# Patient Record
Sex: MALE | Race: WHITE | NOT HISPANIC OR LATINO | URBAN - METROPOLITAN AREA
[De-identification: names, ages, dates, MRNs, and addresses within clinical notes are randomized per-mention and may not be internally consistent; named-entity substitution may affect disease eponyms.]

---

## 2017-01-01 ENCOUNTER — OFFICE VISIT - HEALTHEAST (OUTPATIENT)
Dept: GERIATRICS | Facility: CLINIC | Age: 82
End: 2017-01-01

## 2017-01-01 ENCOUNTER — COMMUNICATION - HEALTHEAST (OUTPATIENT)
Dept: GERIATRICS | Facility: CLINIC | Age: 82
End: 2017-01-01

## 2017-01-01 DIAGNOSIS — R13.10 DYSPHAGIA, UNSPECIFIED TYPE: ICD-10-CM

## 2017-01-01 DIAGNOSIS — G30.9 ALZHEIMER'S DEMENTIA WITHOUT BEHAVIORAL DISTURBANCE, UNSPECIFIED TIMING OF DEMENTIA ONSET: ICD-10-CM

## 2017-01-01 DIAGNOSIS — F02.80 ALZHEIMER'S DEMENTIA WITHOUT BEHAVIORAL DISTURBANCE, UNSPECIFIED TIMING OF DEMENTIA ONSET: ICD-10-CM

## 2017-01-01 DIAGNOSIS — Z51.5 PALLIATIVE CARE ENCOUNTER: ICD-10-CM

## 2017-01-01 DIAGNOSIS — Z86.69 HISTORY OF SEIZURE DISORDER: ICD-10-CM

## 2017-01-01 DIAGNOSIS — R13.0 APHAGIA: ICD-10-CM

## 2017-01-01 DIAGNOSIS — R47.01 APHASIA: ICD-10-CM

## 2017-01-01 DIAGNOSIS — Z87.19 HISTORY OF GI BLEED: ICD-10-CM

## 2017-01-01 DIAGNOSIS — R13.10 DYSPHAGIA: ICD-10-CM

## 2017-01-01 DIAGNOSIS — F41.9 ANXIETY: ICD-10-CM

## 2017-01-01 RX ORDER — LORAZEPAM 0.5 MG/1
0.25 TABLET ORAL 2 TIMES DAILY
Qty: 90 TABLET | Refills: 3 | Status: SHIPPED | OUTPATIENT
Start: 2017-01-01

## 2017-01-01 ASSESSMENT — MIFFLIN-ST. JEOR
SCORE: 1317.18
SCORE: 1306.29
SCORE: 1215.57
SCORE: 1317.18
SCORE: 1215.57
SCORE: 1252.31

## 2017-12-26 ENCOUNTER — AMBULATORY - HEALTHEAST (OUTPATIENT)
Dept: GERIATRICS | Facility: CLINIC | Age: 82
End: 2017-12-26

## 2021-05-30 ENCOUNTER — RECORDS - HEALTHEAST (OUTPATIENT)
Dept: ADMINISTRATIVE | Facility: CLINIC | Age: 86
End: 2021-05-30

## 2021-05-30 VITALS — BODY MASS INDEX: 24.75 KG/M2 | HEIGHT: 66 IN | WEIGHT: 154 LBS

## 2021-05-31 ENCOUNTER — RECORDS - HEALTHEAST (OUTPATIENT)
Dept: ADMINISTRATIVE | Facility: CLINIC | Age: 86
End: 2021-05-31

## 2021-05-31 VITALS — BODY MASS INDEX: 25.13 KG/M2 | WEIGHT: 156.4 LBS | HEIGHT: 66 IN

## 2021-05-31 VITALS — WEIGHT: 156.4 LBS | HEIGHT: 66 IN | BODY MASS INDEX: 25.13 KG/M2

## 2021-05-31 VITALS — HEIGHT: 66 IN | BODY MASS INDEX: 22.84 KG/M2 | WEIGHT: 142.1 LBS

## 2021-05-31 VITALS — WEIGHT: 134 LBS | HEIGHT: 66 IN | BODY MASS INDEX: 21.53 KG/M2

## 2021-05-31 VITALS — WEIGHT: 134 LBS | BODY MASS INDEX: 21.53 KG/M2 | HEIGHT: 66 IN

## 2021-06-02 ENCOUNTER — RECORDS - HEALTHEAST (OUTPATIENT)
Dept: ADMINISTRATIVE | Facility: CLINIC | Age: 86
End: 2021-06-02

## 2021-06-08 NOTE — PROGRESS NOTES
[facility]    Facility:   [facility] [[id number]]   Code Status: UNKNOWN      CHIEF COMPLAINT/REASON FOR VISIT:  Chief Complaint   Patient presents with     Review Of Multiple Medical Conditions     Severe dementia without behaviors with a aphasia and dysphasia. He did recently graduate from hospice, right eye conjunctivitis with possible blepharitis which is been treated.       HISTORY:      HPI: Anastacio is a 84 y.o. male who resides here at the [facility] with severe Alzheimer's disease which is progressive. He is nonambulatory and nonverbal and does have dysphagia. He does eat puréed food in he's eating drinking okay with difficulty according to the staff. He likely has not changed since her last visit and he seems comfortable at this time. Because of is a aphasia I am unable to get a review of systems at this time. Staff is no new concerns except for some dandruff.    Past Medical History   Diagnosis Date     Anemia      Dementia of the Alzheimer's type      Family history of GI bleeding      History of seizure disorder              History reviewed. No pertinent family history.  Social History     Social History     Marital status: Single     Spouse name: N/A     Number of children: N/A     Years of education: N/A     Social History Main Topics     Smoking status: Unknown If Ever Smoked     Smokeless tobacco: None     Alcohol use None     Drug use: None     Sexual activity: Not Asked     Other Topics Concern     None     Social History Narrative         Review of Systems   Reason unable to perform ROS: patient is a phasic. With severe dementia.       .  Vitals:    01/25/17 1331   BP: 132/78   Pulse: 78   Resp: 20   Temp: 97.4  F (36.3  C)   SpO2: 96%       Physical Exam   Constitutional: No distress.   HENT:   Head: Atraumatic.   Eyes: Right eye exhibits no discharge. Left eye exhibits no discharge.   Neck: No thyromegaly present.   Cardiovascular:  Normal rate and regular rhythm.    Pulmonary/Chest: No respiratory distress. He has no wheezes. He has no rales.   Abdominal: He exhibits no distension. There is no tenderness.   Musculoskeletal: He exhibits no edema, tenderness or deformity.   Neurological:   A phasic does not follow commands.   Skin: He is not diaphoretic.         LABS:       ASSESSMENT:      ICD-10-CM    1. Alzheimer's dementia without behavioral disturbance, unspecified timing of dementia onset G30.9     F02.80    2. Dysphagia R13.10    3. Aphasia R47.01    4. History of seizure disorder Z86.69        PLAN:  Plan at this time is to continue to monitor above medical problems and no other changes to care plan at this  Time. Care plan was reviewed and is appropriate.      Total  minutes of which % was spent counseling and coordination of care of the above plan.    Electronically signed by: Fantasma Hernandes DO

## 2021-06-09 NOTE — PROGRESS NOTES
[facility]    Name:   Anastacio Pantoja  : 3/2/1932  Facility:   [facility] [[id number]]   Room: 205B  Code Status: DNR/DNI -   Fac type:   NF (Long Term Care, LTC) -     CHIEF COMPLAINT / REASON FOR VISIT:  Chief Complaint   Patient presents with     Review Of Multiple Medical Conditions    Patient was last seen by me on 16 and subsequently seen by Dr. Hernandes on 17.    HPI: Anastacio is an 85 y.o. male who had been on hospice until 16, when he was discharged as he was no longer eligible with a life expectancy of <6 months. Nonetheless his primary diagnosis is Alzheimer's dementia, and it is end-stage. He is essentially nonverbal and has contractures of bilateral hands. He also suffers now from dysphagia, requiring a puréed diet. When seen today, he is pretty much unresponsive as he has been during earlier visits. He is found in a tilt in space chair. He receives 0.25 mg of lorazepam each day at 6 AM. Needless to say, a review of systems with the patient is not possible. Nursing staff note no particular issues, and no new orders have been written since he was last seen by me. He does have a history of seizure disorder but has had no recent seizure activity. There has also been no evidence of G.I. discomfort suggestive of a recurrence of his G.I. bleed of a few years back. He does continue on famotidine for that. The only other medications he receives are Colace and morphine. Bowels are working well.    Past Medical History:   Diagnosis Date     Anemia      Dementia of the Alzheimer's type      Family history of GI bleeding      History of seizure disorder               No family history on file.  Social History     Social History     Marital status: Single     Spouse name: N/A     Number of children: N/A     Years of education: N/A     Social History Main Topics     Smoking status: Unknown If Ever Smoked     Smokeless tobacco: Not on file     Alcohol use  "Not on file     Drug use: Not on file     Sexual activity: Not on file     Other Topics Concern     Not on file     Social History Narrative     MEDICATIONS: Reviewed from the MAR, physician orders, and earlier progress notes.  Current Outpatient Prescriptions   Medication Sig     acetaminophen (TYLENOL) 650 MG CR tablet Take 650 mg by mouth 3 (three) times a day. Max dose of 3000 mg/24 hours from all sources      docusate sodium (COLACE) 100 MG capsule Take 100 mg by mouth 2 (two) times a day. Indications: Constipation     famotidine (PEPCID) 20 MG tablet Take 20 mg by mouth bedtime.     LORazepam (ATIVAN) 0.5 MG tablet Take 0.25 mg by mouth 2 (two) times a day. Give at AM and HS due to combative behaviors. May repeat X 1 within the hour if ineffective.      morphine 20 mg/1 mL concentrated solution Take 5 mg by mouth every 4 (four) hours as needed for pain. Indications: PAIN OR SOB     ALLERGIES: No Known Allergies    DIET: Regular, puréed texture.    Vitals:    03/14/17 1826   BP: 128/80   Pulse: 80   Resp: 20   Temp: 96.6  F (35.9  C)   Weight: 154 lb (69.9 kg)   Height: 5' 6\" (1.676 m)     EXAMINATION:   General: Severely demented elderly male, up in a tilt in space wheelchair, essentially nonverbal.  Head: Normocephalic and atraumatic.   Eyes: PERRLA, sclerae clear.   ENT: Moist oral mucosa.   Cardiovascular: Regular rate and rhythm. No appreciable murmur.  Respiratory: Lungs clear to auscultation bilaterally.   Abdomen: Soft and nontender.   Musculoskeletal/Extremities: Age-related degenerative joint disease. No peripheral edema.  Integument: No rashes, clinically significant lesions, or skin breakdown.   Cognitive/Psychiatric:  Severely demented with unscorable BIMS. He does smile but is otherwise aphasic.    DIAGNOSTICS:   11/01/16: TSH 4.65; 25 OH vitamin D 43.  05/05/15: sodium 145, potassium 4.6, chloride 107, CO2 32, BUN 25, creatinine.9, estimate GFR >60, calcium 9.2; WBC 4.5, RBC 4.41, hemoglobin " 13.9, hematocrit 41.9, MCV 95, platelets 145.    ASSESSMENT/Plan:      ICD-10-CM    1. Alzheimer's dementia without behavioral disturbance, unspecified timing of dementia onset G30.9     F02.80    2. Aphasia R47.01    3. Dysphagia, unspecified type R13.10    4. History of seizure disorder Z86.69    5. History of GI bleed Z87.19      CHANGES:    None.    CARE PLAN:    The care plan has been reviewed and all orders signed. Changes to care plan, if any, as noted. Otherwise, continue care plan of care.      Electronically signed by: Antonino Navarro, CNP

## 2021-06-10 NOTE — PROGRESS NOTES
[facility]    Facility:   [facility] [[id number]]   Code Status: UNKNOWN      CHIEF COMPLAINT/REASON FOR VISIT:  Chief Complaint   Patient presents with     Review Of Multiple Medical Conditions     Severe dementia, aphasia, dysphagia, recent conjunctivitis, comfort care management, constipation, elevated TSH.       HISTORY:      HPI: Anastacio is a 85 y.o. male who resides here at the [facility] on comfort cares and palliative management secondary to multiple medical problems which include Alzheimer's dementia which is progressive he is basically nonverbal and he does have swallowing issues on thick puréed food.  He is on thickened liquids also and staff indicates to me that he is doing well.  He was taken off hospice secondary to his life expectancy seem to be greater than 6 months.  Staff has no concerns at this time and patient does not indicate to me there any issues.  Patient today is nonverbal and does not really respond too much to me except for smiling.  He seems extremely comfortable at this time and staff has no new concerns.    Past Medical History:   Diagnosis Date     Anemia      Dementia of the Alzheimer's type      Family history of GI bleeding      History of seizure disorder              History reviewed. No pertinent family history.  Social History     Social History     Marital status: Single     Spouse name: N/A     Number of children: N/A     Years of education: N/A     Social History Main Topics     Smoking status: Unknown If Ever Smoked     Smokeless tobacco: None     Alcohol use None     Drug use: None     Sexual activity: Not Asked     Other Topics Concern     None     Social History Narrative         Review of Systems   Unable to perform ROS: Dementia       .  Vitals:    04/26/17 1038   BP: 132/80   Pulse: 78   Resp: 20   Temp: (!) 96.4  F (35.8  C)   SpO2: 96%       Physical Exam   Constitutional:   Patient is sitting in chair  comfortable with a reclining position.  He is alert but not orientated at all and he does not verbalize.   HENT:   Head: Normocephalic and atraumatic.   Eyes: Right eye exhibits no discharge. No scleral icterus.   Cardiovascular: Normal rate and regular rhythm.  Exam reveals no gallop.    No murmur heard.  Pulmonary/Chest: Effort normal and breath sounds normal. No respiratory distress.   Abdominal: Soft. Bowel sounds are normal. There is no tenderness. There is no rebound.   Musculoskeletal: He exhibits no edema or tenderness.   Neurological: He is alert.   Skin: Skin is warm.         LABS: Laboratory values are as follows vitamin D level on November 1, 2016 was 46 TSH on that date was 4.65 and on 5/5/2015 basic metabolic profile and CBC were within normal limits.  No further labs since then.      ASSESSMENT:      ICD-10-CM    1. Alzheimer's dementia without behavioral disturbance, unspecified timing of dementia onset G30.9     F02.80    2. Aphasia R47.01    3. Dysphagia, unspecified type R13.10    4. Palliative care encounter Z51.5        PLAN: Plan at this time is to continue to monitor above medical problems and no other changes to care plan at this time.  We will continue with comfort care measures and palliative care measures and no other changes.      Total  minutes of which % was spent counseling and coordination of care of the above plan.    Electronically signed by: Fantasma Hernandes DO

## 2021-06-10 NOTE — PROGRESS NOTES
[facility]    Name:   Anastacio Pantoja  : 3/2/1932  Facility:   [facility] [[id number]]   Room: 205B  Code Status: DNR/DNI -   Fac type:   NF (Long Term Care, LTC) -     CHIEF COMPLAINT / REASON FOR VISIT:  Chief Complaint   Patient presents with     Review Of Multiple Medical Conditions    Patient was last seen by me on 17 and subsequently seen by Dr. Hernandes on 17.    HPI: Anastacio is an 85 y.o. male who had been on hospice until mid-2016, when he was discharged as he was no longer eligible with a life expectancy of <6 months. Nonetheless his primary diagnosis is Alzheimer's dementia, and it is end-stage. He is essentially nonverbal and has contractures of bilateral hands. He also suffers now from dysphagia, requiring a puréed diet. When seen today, he is pretty much unresponsive as he has been during earlier visits. He is found in a tilt in space chair. He receives 0.25 mg of lorazepam each day at 6 AM.     Needless to say, a review of systems with the patient is not possible. Nursing staff note no particular issues, and no new orders have been written since he was last seen by me. He does have a history of seizure disorder but has had no recent seizure activity. There has also been no evidence of G.I. discomfort suggestive of a recurrence of his G.I. bleed of a few years back. He does continue on famotidine for that. The only other medications he receives are Colace and morphine. Bowels are working well. His weight has been steady.    Past Medical History:   Diagnosis Date     Anemia      Dementia of the Alzheimer's type      Family history of GI bleeding      History of seizure disorder               No family history on file.  Social History     Social History     Marital status: Single     Spouse name: N/A     Number of children: N/A     Years of education: N/A     Social History Main Topics     Smoking status: Unknown If Ever Smoked     Smokeless  "tobacco: Not on file     Alcohol use Not on file     Drug use: Not on file     Sexual activity: Not on file     Other Topics Concern     Not on file     Social History Narrative     MEDICATIONS: Reviewed from the MAR, physician orders, and earlier progress notes.  Current Outpatient Prescriptions   Medication Sig     acetaminophen (TYLENOL) 650 MG CR tablet Take 650 mg by mouth 3 (three) times a day. Max dose of 3000 mg/24 hours from all sources      docusate sodium (COLACE) 100 MG capsule Take 100 mg by mouth 2 (two) times a day. Indications: Constipation     famotidine (PEPCID) 20 MG tablet Take 20 mg by mouth bedtime.     LORazepam (ATIVAN) 0.5 MG tablet Take 0.25 mg by mouth 2 (two) times a day. Give at AM and HS due to combative behaviors. May repeat X 1 within the hour if ineffective.      morphine 20 mg/1 mL concentrated solution Take 5 mg by mouth every 4 (four) hours as needed for pain. Indications: PAIN OR SOB     ALLERGIES: No Known Allergies    DIET: Regular, puréed texture.    Vitals:    05/23/17 1503   BP: 128/68   Pulse: 76   Resp: 20   Temp: 97.2  F (36.2  C)   Weight: 156 lb 6.4 oz (70.9 kg)   Height: 5' 6\" (1.676 m)     EXAMINATION:   General: Severely demented elderly male, up in a tilt in space wheelchair, essentially nonverbal.  Head: Normocephalic and atraumatic.   Eyes: PERRLA, sclerae clear.   ENT: Moist oral mucosa.   Cardiovascular: Regular rate and rhythm. No appreciable murmur.  Respiratory: Lungs clear to auscultation bilaterally.   Abdomen: Soft and nontender.   Musculoskeletal/Extremities: Age-related degenerative joint disease. No peripheral edema.  Integument: No rashes, clinically significant lesions, or skin breakdown.   Cognitive/Psychiatric:  Severely demented with unscorable BIMS. He does smile but is otherwise aphasic.    DIAGNOSTICS:   11/01/16: TSH 4.65; 25 OH vitamin D 43.  05/05/15: sodium 145, potassium 4.6, chloride 107, CO2 32, BUN 25, creatinine.9, estimate GFR >60, " calcium 9.2; WBC 4.5, RBC 4.41, hemoglobin 13.9, hematocrit 41.9, MCV 95, platelets 145.    ASSESSMENT/Plan:      ICD-10-CM    1. Alzheimer's dementia without behavioral disturbance, unspecified timing of dementia onset G30.9     F02.80    2. Aphasia R47.01    3. Dysphagia, unspecified type R13.10    4. History of seizure disorder Z86.69    5. History of GI bleed Z87.19      CHANGES:    None.    CARE PLAN:    The care plan has been reviewed and all orders signed. Changes to care plan, if any, as noted. Otherwise, continue care plan of care.      Electronically signed by: Antonino Navarro CNP

## 2021-06-11 NOTE — PROGRESS NOTES
[facility]    Name:   Anastacio Pantoja  : 3/2/1932  Facility:   [facility] [[id number]]   Room: 205B  Code Status: DNR/DNI -   Fac type:   NF (Long Term Care, LTC) -     CHIEF COMPLAINT / REASON FOR VISIT:  Chief Complaint   Patient presents with     Review Of Multiple Medical Conditions    Patient was last seen by me on 17.    HPI: Anastacio is an 85 y.o. male who had been on hospice until mid-2016, when he was discharged as he was no longer eligible with a life expectancy of <6 months. Nonetheless his primary diagnosis is Alzheimer's dementia, and it is end-stage. He is essentially nonverbal and has contractures of bilateral hands. He also suffers now from dysphagia, requiring a puréed diet. When seen today, he is pretty much as unresponsive as he has been during earlier visits, except he does actually chuckle/laugh when I mention autograft pictures in his room of [employer] and [employer].  These are owned by his roommate.  Of course, his laughing may just be coincidental.  He is found in a tilt in space chair. He receives 0.25 mg of lorazepam each day at 6 AM.     Needless to say, a review of systems with the patient is not possible. Nursing staff note no particular issues, and no new orders have been written since he was last seen by me. He does have a history of seizure disorder but has had no recent seizure activity. There has also been no evidence of G.I. discomfort suggestive of a recurrence of his G.I. bleed of a few years back. He does continue on famotidine for that. The only other medications he receives are Colace and morphine. Bowels are working well. His weight has been steady.    Past Medical History:   Diagnosis Date     Anemia      Dementia of the Alzheimer's type      Family history of GI bleeding      History of seizure disorder               No family history on file.  Social History     Social History     Marital status: Single      "Spouse name: N/A     Number of children: N/A     Years of education: N/A     Social History Main Topics     Smoking status: Unknown If Ever Smoked     Smokeless tobacco: Not on file     Alcohol use Not on file     Drug use: Not on file     Sexual activity: Not on file     Other Topics Concern     Not on file     Social History Narrative     MEDICATIONS: Reviewed from the MAR, physician orders, and earlier progress notes.  Current Outpatient Prescriptions   Medication Sig     acetaminophen (TYLENOL) 650 MG CR tablet Take 650 mg by mouth 3 (three) times a day. Max dose of 3000 mg/24 hours from all sources      docusate sodium (COLACE) 100 MG capsule Take 100 mg by mouth 2 (two) times a day. Indications: Constipation     famotidine (PEPCID) 20 MG tablet Take 20 mg by mouth bedtime.     LORazepam (ATIVAN) 0.5 MG tablet Take 0.25 mg by mouth 2 (two) times a day. Give at AM and HS due to combative behaviors. May repeat X 1 within the hour if ineffective.      morphine 20 mg/1 mL concentrated solution Take 5 mg by mouth every 4 (four) hours as needed for pain. Indications: PAIN OR SOB     ALLERGIES: No Known Allergies    DIET: Regular, puréed texture.    Vitals:    06/13/17 1336   BP: 126/70   Pulse: 80   Resp: 20   Temp: (!) 96  F (35.6  C)   Weight: 156 lb 6.4 oz (70.9 kg)   Height: 5' 6\" (1.676 m)    Weight is from 04/01/17.    EXAMINATION:   General: Severely demented elderly male, up in a tilt in space wheelchair, essentially nonverbal.  He is also not entirely cooperative with the examination, as he appears to have receptive aphasia as well.  Head: Normocephalic and atraumatic.   Eyes: PERRLA, sclerae clear.   ENT: Moist oral mucosa.   Cardiovascular: Regular rate and rhythm. No appreciable murmur.  Respiratory: Lungs clear to auscultation bilaterally.   Abdomen: Soft and nontender.   Musculoskeletal/Extremities: Age-related degenerative joint disease. No peripheral edema.  Integument: Seborrheic dermatitis in " bilateral nasolabial folds down to the chin.  Cognitive/Psychiatric:  Severely demented with unscorable BIMS. He does smile but is otherwise aphasic.    DIAGNOSTICS:   11/01/16: TSH 4.65; 25 OH vitamin D 43.  05/05/15: sodium 145, potassium 4.6, chloride 107, CO2 32, BUN 25, creatinine.9, estimate GFR >60, calcium 9.2; WBC 4.5, RBC 4.41, hemoglobin 13.9, hematocrit 41.9, MCV 95, platelets 145.    ASSESSMENT/Plan:      ICD-10-CM    1. Alzheimer's dementia without behavioral disturbance, unspecified timing of dementia onset G30.9     F02.80    2. Aphasia R47.01    3. Dysphagia, unspecified type R13.10    4. History of GI bleed Z87.19    5. History of seizure disorder Z86.69      CHANGES:    None.    CARE PLAN:    The care plan has been reviewed and all orders signed. Changes to care plan, if any, as noted. Otherwise, continue care plan of care.      Electronically signed by: Antonino Navarro CNP

## 2021-06-12 NOTE — PROGRESS NOTES
[facility]    Facility:   [facility] [[id number]]   Code Status: UNKNOWN      CHIEF COMPLAINT/REASON FOR VISIT:  Chief Complaint   Patient presents with     Review Of Multiple Medical Conditions     Alzheimer's dementia, history of seizure disorder currently on Keppra, hypothyroidism, comfort care management.       HISTORY:      HPI: Anastacio is a 85 y.o. male who resides here at the [facility] for multiple medical problems he currently has palliative care and comfort care secondary to end-stage dementia Alzheimer's type.  He does have a seizure disorder and he does have hypothyroid but is not on any thyroid medications last TSH was 4.65.  Nurses have no new complaints at this time and no new concerns.  They indicate to me that he is currently comfortable on his current pain regime and there are no new concerns.    Patient currently has no new concerns apparently he does not speak to me at this time but seems comfortable sitting up in his chair with his contractures in his legs.  According to staff there are no new concerns at this time and he appears to be comfortable.    Past Medical History:   Diagnosis Date     Anemia      Dementia of the Alzheimer's type      Family history of GI bleeding      History of seizure disorder              History reviewed. No pertinent family history.  Social History     Social History     Marital status: Single     Spouse name: N/A     Number of children: N/A     Years of education: N/A     Social History Main Topics     Smoking status: Unknown If Ever Smoked     Smokeless tobacco: None     Alcohol use None     Drug use: None     Sexual activity: Not Asked     Other Topics Concern     None     Social History Narrative         Review of Systems   Unable to perform ROS: Dementia       .  Vitals:    08/30/17 0910   BP: 130/55   Pulse: (!) 58   Resp: 16   Temp: (!) 96  F (35.6  C)   SpO2: 98%       Physical Exam   Constitutional: No  distress.   HENT:   Head: Normocephalic and atraumatic.   Nose: Nose normal.   Eyes: Right eye exhibits no discharge. Left eye exhibits no discharge.   Cardiovascular: Normal rate and regular rhythm.    Pulmonary/Chest: Effort normal and breath sounds normal. No respiratory distress. He has no wheezes. He has no rales.   Abdominal: Soft. Bowel sounds are normal. There is no tenderness. There is no rebound.   Neurological: He is alert.   Patient is nonverbal at this time does not speak to me but does acknowledge him there and he is alert.   Skin: Skin is warm and dry. He is not diaphoretic.         LABS: Reviewed.    ASSESSMENT:      ICD-10-CM    1. Alzheimer's dementia without behavioral disturbance, unspecified timing of dementia onset G30.9     F02.80    2. Aphasia R47.01    3. History of seizure disorder Z86.69    4. Palliative care encounter Z51.5    5. Dysphagia, unspecified type R13.10        PLAN: Plan at this time will continue to monitor above medical problems no new changes patient appears to be comfortable on current regime of medications on Ativan and morphine as needed.  He is currently on acetaminophen and senna docusate and staff has no concerns at this point.      Total  minutes of which % was spent counseling and coordination of care of the above plan.    Electronically signed by: Fantasma Hernandes DO

## 2021-06-13 NOTE — PROGRESS NOTES
[facility]    Name:   Anastacio Pantoja  : 3/2/1932  Facility:   [facility] [[id number]]   Room: 205B  Code Status: DNR/DNI -   Fac type:   NF (Long Term Care, LTC) - Locked Unit    CHIEF COMPLAINT / REASON FOR VISIT:  Chief Complaint   Patient presents with     Review Of Multiple Medical Conditions    Patient was last seen by me on 17.    HPI: Anastacio is an 85 y.o. male who had been on hospice until mid-2016, when he was discharged as he was no longer eligible with a life expectancy of <6 months. Nonetheless his primary diagnosis is Alzheimer's dementia, and it is severe/end-stage. He is essentially nonverbal, has contractures of bilateral hands and knees, and require total assist with all ADLs. He also suffers now from dysphagia, requiring a puréed diet and feeding by nursing staff.  When visiting with him, he is found in a tilt in space chair. He receives 0.25 mg of lorazepam each day at 6 AM.  He has lost 22 pounds over the last 5 months (although he has been stable over the last month and may be eligible for hospice once again.    Needless to say, a review of systems with the patient is not possible. Nursing staff note no particular issues, and no new orders have been written since he was last seen by me. He does have a history of seizure disorder but has had no recent seizure activity. There has also been no evidence of G.I. discomfort suggestive of a recurrence of his G.I. bleed of a few years back. He does continue on famotidine for that. The only other medications he receives are Colace and morphine. Bowels are working well. His weight has been steady.    Past Medical History:   Diagnosis Date     Anemia      Dementia of the Alzheimer's type      Family history of GI bleeding      History of seizure disorder               No family history on file.  Social History     Social History     Marital status: Single     Spouse name: N/A     Number of children: N/A      "Years of education: N/A     Social History Main Topics     Smoking status: Unknown If Ever Smoked     Smokeless tobacco: Not on file     Alcohol use Not on file     Drug use: Not on file     Sexual activity: Not on file     Other Topics Concern     Not on file     Social History Narrative     MEDICATIONS: Reviewed from the MAR, physician orders, and earlier progress notes.  Current Outpatient Prescriptions   Medication Sig     acetaminophen (TYLENOL) 650 MG CR tablet Take 650 mg by mouth 3 (three) times a day. Max dose of 3000 mg/24 hours from all sources      docusate sodium (COLACE) 100 MG capsule Take 100 mg by mouth 2 (two) times a day. Indications: Constipation     famotidine (PEPCID) 20 MG tablet Take 20 mg by mouth bedtime.     LORazepam (ATIVAN) 0.5 MG tablet Take 0.25 mg by mouth 2 (two) times a day. Give at AM and HS due to combative behaviors. May repeat X 1 within the hour if ineffective.      morphine 20 mg/1 mL concentrated solution Take 5 mg by mouth every 4 (four) hours as needed for pain. Indications: PAIN OR SOB     ALLERGIES: No Known Allergies    DIET: Regular, puréed texture.    Vitals:    10/15/17 1832   BP: 112/62   Pulse: 68   Resp: 18   Temp: 97.5  F (36.4  C)   Weight: 134 lb (60.8 kg)   Height: 5' 6\" (1.676 m)   Weight loss of 23 pounds over the last 5 months.    EXAMINATION:   General: Severely demented elderly male, up in a tilt in space wheelchair, essentially nonverbal.  He is also not entirely cooperative with the examination, as he appears to have receptive aphasia as well.  Head: Normocephalic and atraumatic.   Eyes: PERRLA, sclerae clear.   ENT: Moist oral mucosa.   Cardiovascular: Regular rate and rhythm. No appreciable murmur.  Respiratory: Lungs clear to auscultation bilaterally.   Abdomen: Soft and nontender.   Musculoskeletal/Extremities: Age-related degenerative joint disease. No peripheral edema.  Integument: Seborrheic dermatitis in bilateral nasolabial folds down to the " chin.  Cognitive/Psychiatric:  Severely demented with unscorable BIMS. He does smile but is otherwise aphasic.    DIAGNOSTICS:   11/01/16: TSH 4.65; 25 OH vitamin D 43.  05/05/15: sodium 145, potassium 4.6, chloride 107, CO2 32, BUN 25, creatinine.9, estimate GFR >60, calcium 9.2; WBC 4.5, RBC 4.41, hemoglobin 13.9, hematocrit 41.9, MCV 95, platelets 145.    ASSESSMENT/Plan:      ICD-10-CM    1. Alzheimer's dementia without behavioral disturbance, unspecified timing of dementia onset G30.9     F02.80    2. Aphasia R47.01    3. Dysphagia, unspecified type R13.10    4. History of seizure disorder Z86.69    5. History of GI bleed Z87.19      CHANGES:    None.    CARE PLAN:    The care plan has been reviewed and all orders signed. Changes to care plan, if any, as noted. Otherwise, continue care plan of care.      Electronically signed by: Antonino Navarro CNP

## 2021-06-13 NOTE — PROGRESS NOTES
[facility]    Name:   Anastacio Pantoja  : 3/2/1932  Facility:   [facility] [[id number]]   Room: 205B  Code Status: DNR/DNI -   Fac type:   NF (Long Term Care, LTC) - Locked Unit    CHIEF COMPLAINT / REASON FOR VISIT:  Chief Complaint   Patient presents with     Review Of Multiple Medical Conditions    Patient was last seen by me on 17 is currently seen by Dr. Aguilar on 17.    HPI: Anastacio is an 85 y.o. male who had been on hospice until mid-2016, when he was discharged as he was no longer eligible with a life expectancy of <6 months. Nonetheless his primary diagnosis is Alzheimer's dementia, and it is end-stage. He is essentially nonverbal and has contractures of bilateral hands and knees. He also suffers now from dysphagia, requiring a puréed diet and feeding by nursing staff. He is found in a tilt in space chair. He receives 0.25 mg of lorazepam each day at 6 AM.  He has lost 22 pounds over the last 5 months and may be eligible for hospice once again.    Needless to say, a review of systems with the patient is not possible. Nursing staff note no particular issues, and no new orders have been written since he was last seen by me. He does have a history of seizure disorder but has had no recent seizure activity. There has also been no evidence of G.I. discomfort suggestive of a recurrence of his G.I. bleed of a few years back. He does continue on famotidine for that. The only other medications he receives are Colace and morphine. Bowels are working well. His weight has been steady.    Past Medical History:   Diagnosis Date     Anemia      Dementia of the Alzheimer's type      Family history of GI bleeding      History of seizure disorder               No family history on file.  Social History     Social History     Marital status: Single     Spouse name: N/A     Number of children: N/A     Years of education: N/A     Social History Main Topics     Smoking  "status: Unknown If Ever Smoked     Smokeless tobacco: Not on file     Alcohol use Not on file     Drug use: Not on file     Sexual activity: Not on file     Other Topics Concern     Not on file     Social History Narrative     MEDICATIONS: Reviewed from the MAR, physician orders, and earlier progress notes.  Current Outpatient Prescriptions   Medication Sig     acetaminophen (TYLENOL) 650 MG CR tablet Take 650 mg by mouth 3 (three) times a day. Max dose of 3000 mg/24 hours from all sources      docusate sodium (COLACE) 100 MG capsule Take 100 mg by mouth 2 (two) times a day. Indications: Constipation     famotidine (PEPCID) 20 MG tablet Take 20 mg by mouth bedtime.     LORazepam (ATIVAN) 0.5 MG tablet Take 0.25 mg by mouth 2 (two) times a day. Give at AM and HS due to combative behaviors. May repeat X 1 within the hour if ineffective.      morphine 20 mg/1 mL concentrated solution Take 5 mg by mouth every 4 (four) hours as needed for pain. Indications: PAIN OR SOB     ALLERGIES: No Known Allergies    DIET: Regular, puréed texture.    Vitals:    09/22/17 1326   BP: 120/77   Pulse: 75   Resp: 16   Temp: 98.6  F (37  C)   Weight: 134 lb (60.8 kg)   Height: 5' 6\" (1.676 m)   Weight loss of 23 pounds over the last 5 months.    EXAMINATION:   General: Severely demented elderly male, up in a tilt in space wheelchair, essentially nonverbal.  He is also not entirely cooperative with the examination, as he appears to have receptive aphasia as well.  Head: Normocephalic and atraumatic.   Eyes: PERRLA, sclerae clear.   ENT: Moist oral mucosa.   Cardiovascular: Regular rate and rhythm. No appreciable murmur.  Respiratory: Lungs clear to auscultation bilaterally.   Abdomen: Soft and nontender.   Musculoskeletal/Extremities: Age-related degenerative joint disease. No peripheral edema.  Integument: Seborrheic dermatitis in bilateral nasolabial folds down to the chin.  Cognitive/Psychiatric:  Severely demented with unscorable BIMS. " He does smile but is otherwise aphasic.    DIAGNOSTICS:   11/01/16: TSH 4.65; 25 OH vitamin D 43.  05/05/15: sodium 145, potassium 4.6, chloride 107, CO2 32, BUN 25, creatinine.9, estimate GFR >60, calcium 9.2; WBC 4.5, RBC 4.41, hemoglobin 13.9, hematocrit 41.9, MCV 95, platelets 145.    ASSESSMENT/Plan:      ICD-10-CM    1. Alzheimer's dementia without behavioral disturbance, unspecified timing of dementia onset G30.9     F02.80    2. Aphasia R47.01    3. Dysphagia, unspecified type R13.10    4. History of GI bleed Z87.19    5. History of seizure disorder Z86.69      CHANGES:    None.    CARE PLAN:    The care plan has been reviewed and all orders signed. Changes to care plan, if any, as noted. Otherwise, continue care plan of care.      Electronically signed by: Antonino Navarro CNP

## 2021-06-14 NOTE — PROGRESS NOTES
[facility]    Facility:   [facility] [[id number]]   Code Status: UNKNOWN      CHIEF COMPLAINT/REASON FOR VISIT:  Chief Complaint   Patient presents with     Review Of Multiple Medical Conditions     Dementia of Alzheimer's type, total assist with ADLs, weight loss, aphasia, history of GI bleed, history of seizure disorder.       HISTORY:      HPI: Anastacio is a 85 y.o. male who resides here at the [facility] for multiple medical problems.  He does have end-stage dementia and was on hospice however was discharged secondary to life except expectancy was greater than 6 months.  Still remains extremely debilitated secondary to severe end-stage dementia.  He is nonverbal so he cannot tell me what is going on however staff indicates he has been okay.  He is in a tilt chair all the time and he is full assist with his ADLs.  He does have a weight loss and I am have nutrition see him and staff indicates they have no new concerns at this time.    Patient is still nonverbal at this time and does not really communicate with me staff indicates that there have been no real changes in his status and they claim that he does eat and drink without difficulty.  For a understand of his advanced directive issues that he is still a DNR/DNI.  I did review all his labs from May and they were unremarkable.  He is unable to give me a review of systems.    Past Medical History:   Diagnosis Date     Anemia      Dementia of the Alzheimer's type      Family history of GI bleeding      History of seizure disorder              History reviewed. No pertinent family history.  Social History     Social History     Marital status: Single     Spouse name: N/A     Number of children: N/A     Years of education: N/A     Social History Main Topics     Smoking status: Unknown If Ever Smoked     Smokeless tobacco: None     Alcohol use None     Drug use: None     Sexual activity: Not Asked     Other Topics Concern      None     Social History Narrative         Review of Systems   Unable to perform ROS: Dementia       .  Vitals:    11/29/17 0841   BP: 127/66   Pulse: 69   Resp: 18   Temp: 96.7  F (35.9  C)   SpO2: 96%       Physical Exam   Constitutional:   Patient is sitting in a steel chair and does not appear to be in any acute distress.   HENT:   Head: Normocephalic and atraumatic.   Nose: Nose normal.   Eyes: Right eye exhibits no discharge. Left eye exhibits no discharge.   Cardiovascular: Normal rate and regular rhythm.    Pulmonary/Chest: Effort normal and breath sounds normal. No respiratory distress. He has no wheezes.   Abdominal: Soft. Bowel sounds are normal.   Neurological: He is alert.   Psychiatric: He has a normal mood and affect. His behavior is normal.   Patient is nonverbal.         LABS:       ASSESSMENT:      ICD-10-CM    1. Alzheimer's dementia without behavioral disturbance, unspecified timing of dementia onset G30.9     F02.80    2. Aphasia R47.01    3. Dysphagia, unspecified type R13.10    4. History of seizure disorder Z86.69    5. Aphagia R13.0        PLAN: Plan at this time is to continue to monitor above medical problems and no other changes to care plan at this time.  Care plan was reviewed and is appropriate and no further recommendations.  Patient may be a candidate to revisit hospice at some point however he seems to be the same when he was discharged from hospice at this time.      Total  minutes of which % was spent counseling and coordination of care of the above plan.    Electronically signed by: Fantasma Hernandes DO

## 2021-06-14 NOTE — PROGRESS NOTES
[facility]    Name:   Anastacio Pantoja  : 3/2/1932  Facility:   [facility] [[id number]]   Room: 205B  Code Status: DNR/DNI -   Fac type:   NF (Long Term Care, LTC) - Locked Unit    CHIEF COMPLAINT / REASON FOR VISIT:  Chief Complaint   Patient presents with     Review Of Multiple Medical Conditions    Patient was last seen by me on 10/06/17 and subsequently seen by Dr. Hernandes on 17.    HPI: Anastacio is an 85 y.o. male who had been on hospice in 2016 but was discharged as he was no longer eligible with a life expectancy of <6 months. Nonetheless his primary diagnosis is Alzheimer's dementia, and it is severe/end-stage. He is essentially nonverbal, has contractures of bilateral hands and knees, and require total assist with all ADLs. He also suffers now from dysphagia, requiring a puréed diet and feeding by nursing staff.  When visiting with him, he is found in a tilt in space chair. He receives 0.25 mg of lorazepam each day at 6 AM.  He has lost 22 pounds over the last 5 months (although he has been stable over the last month and may be eligible for hospice once again.      CURRENT ISSUES    His weight has been steady, but I question a recent 8 pound weight gain since last month.    Needless to say, a review of systems with the patient is not possible. Nursing staff note no particular issues, and no new orders have been written since he was last seen by me. He does have a history of seizure disorder but has had no recent seizure activity. There has also been no evidence of G.I. discomfort suggestive of a recurrence of his G.I. bleed of a few years back. He does continue on famotidine for that. The only other medications he receives are Colace and morphine. Bowels are working well.     Past Medical History:   Diagnosis Date     Anemia      Dementia of the Alzheimer's type      Family history of GI bleeding      History of seizure disorder               No family history on  "file.  Social History     Social History     Marital status: Single     Spouse name: N/A     Number of children: N/A     Years of education: N/A     Social History Main Topics     Smoking status: Unknown If Ever Smoked     Smokeless tobacco: Not on file     Alcohol use Not on file     Drug use: Not on file     Sexual activity: Not on file     Other Topics Concern     Not on file     Social History Narrative     MEDICATIONS: Reviewed from the MAR, physician orders, and earlier progress notes.  Current Outpatient Prescriptions   Medication Sig     acetaminophen (TYLENOL) 650 MG CR tablet Take 650 mg by mouth 3 (three) times a day. Max dose of 3000 mg/24 hours from all sources      docusate sodium (COLACE) 100 MG capsule Take 100 mg by mouth 2 (two) times a day. Indications: Constipation     famotidine (PEPCID) 20 MG tablet Take 20 mg by mouth bedtime.     LORazepam (ATIVAN) 0.5 MG tablet Take 0.5 tablets (0.25 mg total) by mouth 2 (two) times a day. Give at AM and HS due to combative behaviors. May repeat X 1 within the hour.     morphine 20 mg/1 mL concentrated solution Take 5 mg by mouth every 4 (four) hours as needed for pain. Indications: PAIN OR SOB     ALLERGIES: No Known Allergies    DIET: Regular, puréed texture.    Vitals:    12/10/17 2011   BP: 114/70   Pulse: 72   Resp: 18   Temp: 96.8  F (36  C)   Weight: 142 lb 1.6 oz (64.5 kg)   Height: 5' 6\" (1.676 m)   After a weight loss of 23 pounds over the previous 5 months, he now appears to be up about 8 pounds.  Fluctuations in weight may be inaccurate.    EXAMINATION:   General: Severely demented elderly male, up in a tilt in space wheelchair, essentially nonverbal.  He is also not entirely cooperative with the examination, as he appears to have receptive aphasia as well.  Head: Normocephalic and atraumatic.   Eyes: PERRLA, sclerae clear.   ENT: Moist oral mucosa.   Cardiovascular: Regular rate and rhythm. No appreciable murmur.  Respiratory: Lungs clear to " auscultation bilaterally.   Abdomen: Soft and nontender.   Musculoskeletal/Extremities: Age-related degenerative joint disease. No peripheral edema.  Integument: Seborrheic dermatitis in bilateral nasolabial folds down to the chin.  Cognitive/Psychiatric:  Severely demented with unscorable BIMS. He does smile but is otherwise aphasic.    DIAGNOSTICS:   11/01/16: TSH 4.65; 25 OH vitamin D 43.  05/05/15: sodium 145, potassium 4.6, chloride 107, CO2 32, BUN 25, creatinine.9, estimate GFR >60, calcium 9.2; WBC 4.5, RBC 4.41, hemoglobin 13.9, hematocrit 41.9, MCV 95, platelets 145.    ASSESSMENT/Plan:      ICD-10-CM    1. Alzheimer's dementia without behavioral disturbance, unspecified timing of dementia onset G30.9     F02.80    2. Aphasia R47.01    3. Dysphagia, unspecified type R13.10    4. History of seizure disorder Z86.69    5. History of GI bleed Z87.19      CHANGES:    None.    CARE PLAN:    The care plan has been reviewed and all orders signed. Changes to care plan, if any, as noted. Otherwise, continue care plan of care.      Electronically signed by: Antonino Navarro CNP